# Patient Record
Sex: MALE | Race: WHITE
[De-identification: names, ages, dates, MRNs, and addresses within clinical notes are randomized per-mention and may not be internally consistent; named-entity substitution may affect disease eponyms.]

---

## 2020-08-21 ENCOUNTER — HOSPITAL ENCOUNTER (EMERGENCY)
Dept: HOSPITAL 47 - EC | Age: 70
Discharge: HOME | End: 2020-08-21
Payer: MEDICARE

## 2020-08-21 VITALS
SYSTOLIC BLOOD PRESSURE: 128 MMHG | HEART RATE: 99 BPM | RESPIRATION RATE: 20 BRPM | DIASTOLIC BLOOD PRESSURE: 75 MMHG | TEMPERATURE: 98.4 F

## 2020-08-21 DIAGNOSIS — Z87.891: ICD-10-CM

## 2020-08-21 DIAGNOSIS — M19.012: Primary | ICD-10-CM

## 2020-08-21 DIAGNOSIS — D64.9: ICD-10-CM

## 2020-08-21 LAB
BASOPHILS # BLD AUTO: 0 K/UL (ref 0–0.2)
BASOPHILS NFR BLD AUTO: 1 %
EOSINOPHIL # BLD AUTO: 0.2 K/UL (ref 0–0.7)
EOSINOPHIL NFR BLD AUTO: 2 %
ERYTHROCYTE [DISTWIDTH] IN BLOOD BY AUTOMATED COUNT: 3.47 M/UL (ref 4.3–5.9)
ERYTHROCYTE [DISTWIDTH] IN BLOOD: 14.9 % (ref 11.5–15.5)
HCT VFR BLD AUTO: 31 % (ref 39–53)
HGB BLD-MCNC: 9.7 GM/DL (ref 13–17.5)
LYMPHOCYTES # SPEC AUTO: 1.2 K/UL (ref 1–4.8)
LYMPHOCYTES NFR SPEC AUTO: 16 %
MCH RBC QN AUTO: 28 PG (ref 25–35)
MCHC RBC AUTO-ENTMCNC: 31.4 G/DL (ref 31–37)
MCV RBC AUTO: 89.2 FL (ref 80–100)
MONOCYTES # BLD AUTO: 0.3 K/UL (ref 0–1)
MONOCYTES NFR BLD AUTO: 4 %
NEUTROPHILS # BLD AUTO: 5.2 K/UL (ref 1.3–7.7)
NEUTROPHILS NFR BLD AUTO: 74 %
PLATELET # BLD AUTO: 359 K/UL (ref 150–450)
WBC # BLD AUTO: 7.1 K/UL (ref 3.8–10.6)

## 2020-08-21 PROCEDURE — 85025 COMPLETE CBC W/AUTO DIFF WBC: CPT

## 2020-08-21 PROCEDURE — 99283 EMERGENCY DEPT VISIT LOW MDM: CPT

## 2020-08-21 PROCEDURE — 36415 COLL VENOUS BLD VENIPUNCTURE: CPT

## 2020-08-21 NOTE — ED
Upper Extremity HPI





- General


Chief Complaint: Extremity Injury, Upper


Stated Complaint: shoulder pain


Time Seen by Provider: 08/21/20 17:45


Source: patient


Mode of arrival: wheelchair


Limitations: no limitations





- History of Present Illness


Initial Comments: 


9-year-old male presenting for left shoulder pain.  Patient states that he 

dislocated his shoulder while trying to lift something he states he relocated 

himself.  Patient denies any numbness tingling loss of sensation of the shoulder

he states he is localized pain to the joint he states he also has a history of 

rheumatoid arthritis.  Patient states that he was moving stuff into a new 

apartment and has been doing a lot of heavy lifting and now is persistent left 

shoulder pain he denies a chest pain short of breath he laughed when I asked if 

he had chest pain he states that this is deathly from lifting things.  Denies 

any neck pain times a falls or direct trauma denies any fevers.  Patient does a 

nausea vomiting jaw pain.  Patient states that he also like his hemoglobin 

checked as he was just discharged 5 days ago for GI bleed he states he's 

watching his stools denies any dark stools or bloody stools he states he has no 

symptoms denies any abdominal pain, weakness, sob or lightheadedness. Patient 

appears well and is very pleasant on arrival.








- Related Data


                                    Allergies











Allergy/AdvReac Type Severity Reaction Status Date / Time


 


No Known Allergies Allergy   Verified 08/21/20 17:43














Review of Systems


ROS Statement: 


Those systems with pertinent positive or pertinent negative responses have been 

documented in the HPI.





ROS Other: All systems not noted in ROS Statement are negative.





Past Medical History


Past Medical History: Coronary Artery Disease (CAD), GERD/Reflux, GI Bleed, 

Rheumatoid Arthritis (RA)


Additional Past Medical History / Comment(s): back pain


History of Any Multi-Drug Resistant Organisms: None Reported


Past Surgical History: Heart Catheterization With Stent


Past Psychological History: Anxiety


Smoking Status: Former smoker


Past Alcohol Use History: Rare


Past Drug Use History: Marijuana





General Exam





- General Exam Comments


Initial Comments: 


General:  The patient is awake and alert, in no distress, and does not appear 

acutely ill. 


Eye:  +3 mm pupils are equal, round and reactive to light, extra-ocular 

movements are intact.  No nystagmus.  There is normal conjunctiva bilaterally.  

No signs of icterus.  


Ears, nose, mouth and throat:  There are moist mucous membranes and no oral 

lesions. 


Neck:  The neck is supple, there is no tenderness or JVD.  


Cardiovascular:  There is a regular rate and rhythm. Slight murmur, no rub or 

gallop is appreciated.


Respiratory:  Lungs are clear to auscultation, respirations are non-labored, 

breath sounds are equal.  No wheezes, stridor, rales, or rhonchi.


Musculoskeletal:  Normal ROM, with tenderness of left shoulder. Crepitus 

palpated. No gross deformity.  Strength 5/5. Sensation intact proximal and 

distal to injury site equal on opposing extremity. Radial pulses equal 

bilaterally 2+. Can make thumbs up, oppose small digits and thumb, finger 

crossed and ok sign. No wrist drop. NO erdness or swelling of joint. 


Neurological:  A&O x 3. CN II-XII intact grossly, There are no obvious motor or 

sensory deficits. Coordination appears grossly intact. Speech is normal.


Skin:  Skin is warm and dry and no rashes or lesions are noted. 


Psychiatric:  Cooperative, appropriate mood & affect, normal judgment.  





Limitations: no limitations





Course


                                   Vital Signs











  08/21/20





  17:40


 


Temperature 98.4 F


 


Pulse Rate 99


 


Respiratory 20





Rate 


 


Blood Pressure 128/75


 


O2 Sat by Pulse 97





Oximetry 














Medical Decision Making





- Medical Decision Making


XR (-). No symptoms but CBC drawn as patietn has recent gi bleed. he states at 

that time hgb was 5. Hgb 9.7. Patient appears nontoxic, not tachycardic, denies 

bloody or dark stools. Patient neurovascular intact and pain is reproducible at 

this time feel patient is stable for discharge after discussing case by 

attending provider Dr. Leggett.  Patient is placed in sling for comfort given 

a Tylenol 3 starter pack patient discharged appearing well. Recommend pcp and 

orthopedic f/u.








- Lab Data


Result diagrams: 


                                 08/21/20 18:20





                                   Lab Results











  08/21/20 Range/Units





  18:20 


 


WBC  7.1  (3.8-10.6)  k/uL


 


RBC  3.47 L  (4.30-5.90)  m/uL


 


Hgb  9.7 L  (13.0-17.5)  gm/dL


 


Hct  31.0 L  (39.0-53.0)  %


 


MCV  89.2  (80.0-100.0)  fL


 


MCH  28.0  (25.0-35.0)  pg


 


MCHC  31.4  (31.0-37.0)  g/dL


 


RDW  14.9  (11.5-15.5)  %


 


Plt Count  359  (150-450)  k/uL


 


Neutrophils %  74  %


 


Lymphocytes %  16  %


 


Monocytes %  4  %


 


Eosinophils %  2  %


 


Basophils %  1  %


 


Neutrophils #  5.2  (1.3-7.7)  k/uL


 


Lymphocytes #  1.2  (1.0-4.8)  k/uL


 


Monocytes #  0.3  (0-1.0)  k/uL


 


Eosinophils #  0.2  (0-0.7)  k/uL


 


Basophils #  0.0  (0-0.2)  k/uL


 


Hypochromasia  Slight  














Disposition


Clinical Impression: 


 Left shoulder pain, Anemia, Degenerative arthritis of left shoulder region





Disposition: HOME SELF-CARE


Condition: Good


Instructions (If sedation given, give patient instructions):  Shoulder Pain (ED)


Additional Instructions: 


Please use medication as discussed.  Please follow-up with family doctor in the 

next 2 days, repeat HgB which is currently 9.7.  Please return to emergency room

if the symptoms increase or worsen or for any other concerns.


Is patient prescribed a controlled substance at d/c from ED?: No


Referrals: 


Nonstaff,Physician [Primary Care Provider] - 1-2 days


Time of Disposition: 19:01

## 2020-08-21 NOTE — XR
EXAMINATION TYPE: XR shoulder complete LT

 

DATE OF EXAM: 8/21/2020

 

COMPARISON: NONE

 

HISTORY: Shoulder pain

 

TECHNIQUE: 3 views

 

FINDINGS: There are large degenerative cysts in the greater tuberosity of the humerus. Glenohumeral j
oint is intact. There is irregular cortex of the inferior glenoid labrum. The scapula is intact. AC j
oint is intact. There are no pathologic calcifications at the greater tuberosity.

 

IMPRESSION: There is some arthritic change at the inferior glenohumeral joint. Degenerative cyst in t
he greater tuberosity. No fracture.

 

There is coarse pulmonary interstitial density in the visualized left lungs that could relate to inte
rstitial fibrosis.